# Patient Record
Sex: MALE | URBAN - METROPOLITAN AREA
[De-identification: names, ages, dates, MRNs, and addresses within clinical notes are randomized per-mention and may not be internally consistent; named-entity substitution may affect disease eponyms.]

---

## 2023-04-14 ENCOUNTER — TELEPHONE ENCOUNTER (OUTPATIENT)
Dept: URBAN - METROPOLITAN AREA CLINIC 64 | Facility: CLINIC | Age: 28
End: 2023-04-14

## 2023-04-25 ENCOUNTER — WEB ENCOUNTER (OUTPATIENT)
Dept: URBAN - METROPOLITAN AREA CLINIC 60 | Facility: CLINIC | Age: 28
End: 2023-04-25

## 2023-04-25 ENCOUNTER — WEB ENCOUNTER (OUTPATIENT)
Dept: URBAN - METROPOLITAN AREA CLINIC 63 | Facility: CLINIC | Age: 28
End: 2023-04-25

## 2023-04-27 PROBLEM — 266435005: Status: ACTIVE | Noted: 2023-04-27

## 2023-05-01 ENCOUNTER — OFFICE VISIT (OUTPATIENT)
Dept: URBAN - METROPOLITAN AREA CLINIC 60 | Facility: CLINIC | Age: 28
End: 2023-05-01
Payer: COMMERCIAL

## 2023-05-01 ENCOUNTER — LAB OUTSIDE AN ENCOUNTER (OUTPATIENT)
Dept: URBAN - METROPOLITAN AREA CLINIC 60 | Facility: CLINIC | Age: 28
End: 2023-05-01

## 2023-05-01 VITALS
HEART RATE: 77 BPM | HEIGHT: 70 IN | BODY MASS INDEX: 29.06 KG/M2 | DIASTOLIC BLOOD PRESSURE: 80 MMHG | SYSTOLIC BLOOD PRESSURE: 122 MMHG | TEMPERATURE: 98 F | WEIGHT: 203 LBS | OXYGEN SATURATION: 98 %

## 2023-05-01 DIAGNOSIS — R11.14 BILIOUS VOMITING WITH NAUSEA: ICD-10-CM

## 2023-05-01 DIAGNOSIS — K21.9 GASTROESOPHAGEAL REFLUX DISEASE WITHOUT ESOPHAGITIS: ICD-10-CM

## 2023-05-01 PROCEDURE — 99204 OFFICE O/P NEW MOD 45 MIN: CPT | Performed by: PHYSICIAN ASSISTANT

## 2023-05-01 RX ORDER — OMEPRAZOLE 20 MG/1
1 CAPSULE 30 MINUTES BEFORE MEALS CAPSULE, DELAYED RELEASE ORAL TWICE A DAY
Qty: 60 CAPSULE | Refills: 2 | OUTPATIENT
Start: 2023-05-01

## 2023-05-01 RX ORDER — OMEPRAZOLE 20 MG/1
1 CAPSULE 30 MINUTES BEFORE MORNING MEAL CAPSULE, DELAYED RELEASE ORAL ONCE A DAY
Qty: 30 | Refills: 2 | OUTPATIENT
Start: 2023-05-01

## 2023-05-01 NOTE — HPI-TODAY'S VISIT:
Cheryl is a pleasant 28-year-old male who presents today for evaluation of GERD  H. pylori stool antigen dated 2/6/2023 negative  Labs dated 2/3/2023 showed a TSH of 0.38 (L).  Normal renal function.  Normal LFTs.  Lipase normal.  Normal hemoglobin.  Normal platelets.   Over the last few months has noted worsening reflux despite omeprazole 20 mg qd. Symptoms worse with in the morning. Patient is EGD and colonoscopy naive. Notes 1-2 daily bowel movements with occasional diarrhea. Notes frequent nausea and vomiting. Mentions vomiting old food from the day before. Denies dysphagia, odynophagia, hematemesis, coffee ground emesis, abdominal pain, change in bowel habits, abnormal weight loss, BRBPR or melena. Denies family history of colon cancer or colon polyps. No other GI complaints at this time

## 2023-05-08 ENCOUNTER — LAB OUTSIDE AN ENCOUNTER (OUTPATIENT)
Dept: URBAN - METROPOLITAN AREA CLINIC 60 | Facility: CLINIC | Age: 28
End: 2023-05-08

## 2023-05-19 ENCOUNTER — TELEPHONE ENCOUNTER (OUTPATIENT)
Dept: URBAN - METROPOLITAN AREA CLINIC 64 | Facility: CLINIC | Age: 28
End: 2023-05-19

## 2023-06-02 ENCOUNTER — CLAIMS CREATED FROM THE CLAIM WINDOW (OUTPATIENT)
Dept: URBAN - METROPOLITAN AREA SURGERY CENTER 4 | Facility: SURGERY CENTER | Age: 28
End: 2023-06-02
Payer: COMMERCIAL

## 2023-06-02 ENCOUNTER — CLAIMS CREATED FROM THE CLAIM WINDOW (OUTPATIENT)
Dept: URBAN - METROPOLITAN AREA CLINIC 4 | Facility: CLINIC | Age: 28
End: 2023-06-02
Payer: COMMERCIAL

## 2023-06-02 DIAGNOSIS — R11.2 NAUSEA & VOMITING: ICD-10-CM

## 2023-06-02 DIAGNOSIS — R10.13 EPIGASTRIC PAIN: ICD-10-CM

## 2023-06-02 DIAGNOSIS — K29.70 GASTRITIS: ICD-10-CM

## 2023-06-02 DIAGNOSIS — K29.60 OTHER GASTRITIS WITHOUT BLEEDING: ICD-10-CM

## 2023-06-02 DIAGNOSIS — K31.89 OTHER DISEASES OF STOMACH AND DUODENUM: ICD-10-CM

## 2023-06-02 DIAGNOSIS — K21.9 GASTRO-ESOPHAGEAL REFLUX DISEASE WITHOUT ESOPHAGITIS: ICD-10-CM

## 2023-06-02 PROCEDURE — 88313 SPECIAL STAINS GROUP 2: CPT | Performed by: PATHOLOGY

## 2023-06-02 PROCEDURE — 88312 SPECIAL STAINS GROUP 1: CPT | Performed by: PATHOLOGY

## 2023-06-02 PROCEDURE — 43239 EGD BIOPSY SINGLE/MULTIPLE: CPT | Performed by: INTERNAL MEDICINE

## 2023-06-02 PROCEDURE — 88305 TISSUE EXAM BY PATHOLOGIST: CPT | Performed by: PATHOLOGY

## 2023-06-02 PROCEDURE — 00731 ANES UPR GI NDSC PX NOS: CPT | Performed by: NURSE ANESTHETIST, CERTIFIED REGISTERED

## 2023-06-02 RX ORDER — OMEPRAZOLE 20 MG/1
1 CAPSULE 30 MINUTES BEFORE MORNING MEAL CAPSULE, DELAYED RELEASE ORAL ONCE A DAY
Qty: 30 | Refills: 2 | Status: ACTIVE | COMMUNITY
Start: 2023-05-01

## 2023-06-02 RX ORDER — OMEPRAZOLE 20 MG/1
1 CAPSULE 30 MINUTES BEFORE MEALS CAPSULE, DELAYED RELEASE ORAL TWICE A DAY
Qty: 60 CAPSULE | Refills: 2 | Status: ACTIVE | COMMUNITY
Start: 2023-05-01

## 2023-06-08 PROBLEM — 4556007: Status: ACTIVE | Noted: 2023-06-08

## 2023-06-08 PROBLEM — 266435005: Status: ACTIVE | Noted: 2023-06-08

## 2023-06-19 ENCOUNTER — OFFICE VISIT (OUTPATIENT)
Dept: URBAN - METROPOLITAN AREA CLINIC 63 | Facility: CLINIC | Age: 28
End: 2023-06-19
Payer: COMMERCIAL

## 2023-06-19 VITALS
TEMPERATURE: 96.2 F | DIASTOLIC BLOOD PRESSURE: 70 MMHG | HEART RATE: 76 BPM | WEIGHT: 208 LBS | HEIGHT: 70 IN | OXYGEN SATURATION: 94 % | SYSTOLIC BLOOD PRESSURE: 140 MMHG | BODY MASS INDEX: 29.78 KG/M2

## 2023-06-19 DIAGNOSIS — K29.60 OTHER GASTRITIS WITHOUT BLEEDING: ICD-10-CM

## 2023-06-19 DIAGNOSIS — K31.89 OTHER DISEASES OF STOMACH AND DUODENUM: ICD-10-CM

## 2023-06-19 DIAGNOSIS — K21.9 GASTRO-ESOPHAGEAL REFLUX DISEASE WITHOUT ESOPHAGITIS: ICD-10-CM

## 2023-06-19 PROCEDURE — 99214 OFFICE O/P EST MOD 30 MIN: CPT | Performed by: PHYSICIAN ASSISTANT

## 2023-06-19 RX ORDER — FAMOTIDINE 20 MG/1
1 TABLET AT BEDTIME AS NEEDED TABLET, FILM COATED ORAL ONCE A DAY
Qty: 30 | Refills: 2 | OUTPATIENT
Start: 2023-06-19

## 2023-06-19 RX ORDER — OMEPRAZOLE 20 MG/1
1 CAPSULE 30 MINUTES BEFORE MORNING MEAL CAPSULE, DELAYED RELEASE ORAL ONCE A DAY
Qty: 30 | Refills: 2 | Status: ACTIVE | COMMUNITY
Start: 2023-05-01

## 2023-06-19 NOTE — HPI-TODAY'S VISIT:
Cheryl is a pleasant 28-year-old male who presents today for a procedure follow up.  EGD dated 6/2/2023 showed a regular Z-line.  Gastritis.  Normal duodenum. No significant abnormality in the antrum/body. Lower third esophageal biopsy showed squamocolumnar mucosa with reflux type changes.  No evidence of Gracia's or EOE  H. pylori stool antigen dated 2/6/2023 negative  Labs dated 2/3/2023 showed a TSH of 0.38 (L).  Normal renal function.  Normal LFTs.  Lipase normal.  Normal hemoglobin.  Normal platelets.   No issues after procedure. Last visit noted worsening reflux despite omeprazole 20 mg qd. Symptoms worse with in the morning. Admitted to nausea and vomiting. Mentioned vomiting old food from the day before. He did not complete GES due to cost. He was increased to omeprazole 20 mg BID. He notes acid reflux in the morning still. Notes 1-2 daily bowel movements without constipation or diarrhea. No longer with nausea and vomiting. Denies dysphagia, odynophagia, hematemesis, coffee ground emesis, abdominal pain, change in bowel habits, abnormal weight loss, BRBPR or melena. Denies family history of colon cancer or colon polyps. No other GI complaints at this time

## 2023-06-25 ENCOUNTER — ERX REFILL RESPONSE (OUTPATIENT)
Dept: URBAN - METROPOLITAN AREA CLINIC 60 | Facility: CLINIC | Age: 28
End: 2023-06-25

## 2023-06-25 RX ORDER — OMEPRAZOLE 20 MG/1
TAKE 1 CAPSULE BY MOUTH EVERY DAY 30 MINUTES BEFORE MORNING MEAL FOR 30 DAYS CAPSULE, DELAYED RELEASE ORAL
Qty: 30 CAPSULE | Refills: 2 | OUTPATIENT

## 2023-06-25 RX ORDER — OMEPRAZOLE 20 MG/1
1 CAPSULE 30 MINUTES BEFORE MORNING MEAL CAPSULE, DELAYED RELEASE ORAL ONCE A DAY
Qty: 30 | Refills: 2 | OUTPATIENT

## 2023-08-21 ENCOUNTER — DASHBOARD ENCOUNTERS (OUTPATIENT)
Age: 28
End: 2023-08-21

## 2023-08-21 ENCOUNTER — OFFICE VISIT (OUTPATIENT)
Dept: URBAN - METROPOLITAN AREA CLINIC 60 | Facility: CLINIC | Age: 28
End: 2023-08-21
Payer: COMMERCIAL

## 2023-08-21 VITALS
TEMPERATURE: 97.7 F | BODY MASS INDEX: 30.21 KG/M2 | OXYGEN SATURATION: 97 % | WEIGHT: 211 LBS | DIASTOLIC BLOOD PRESSURE: 80 MMHG | HEIGHT: 70 IN | RESPIRATION RATE: 20 BRPM | HEART RATE: 87 BPM | SYSTOLIC BLOOD PRESSURE: 120 MMHG

## 2023-08-21 DIAGNOSIS — K31.89 OTHER DISEASES OF STOMACH AND DUODENUM: ICD-10-CM

## 2023-08-21 DIAGNOSIS — K21.9 GASTRO-ESOPHAGEAL REFLUX DISEASE WITHOUT ESOPHAGITIS: ICD-10-CM

## 2023-08-21 DIAGNOSIS — K29.60 OTHER GASTRITIS WITHOUT BLEEDING: ICD-10-CM

## 2023-08-21 PROCEDURE — 99213 OFFICE O/P EST LOW 20 MIN: CPT | Performed by: PHYSICIAN ASSISTANT

## 2023-08-21 RX ORDER — FAMOTIDINE 20 MG/1
1 TABLET AT BEDTIME AS NEEDED TABLET, FILM COATED ORAL ONCE A DAY
Qty: 30 | Refills: 2 | Status: ACTIVE | COMMUNITY
Start: 2023-06-19

## 2023-08-21 RX ORDER — FAMOTIDINE 20 MG/1
1 TABLET AS NEEDED TABLET, FILM COATED ORAL TWICE A DAY
Qty: 180 TABLET | Refills: 1 | OUTPATIENT

## 2023-08-21 NOTE — HPI-TODAY'S VISIT:
Cheryl is a pleasant 28-year-old male who presents today for a follow up.  EGD dated 6/2/2023 showed a regular Z-line.  Gastritis.  Normal duodenum. No significant abnormality in the antrum/body. Lower third esophageal biopsy showed squamocolumnar mucosa with reflux type changes.  No evidence of Gracia's or EOE  H. pylori stool antigen dated 2/6/2023 negative  Labs dated 2/3/2023 showed a TSH of 0.38 (L).  Normal renal function.  Normal LFTs.  Lipase normal.  Normal hemoglobin.  Normal platelets.   He did not complete GES due to cost. Currently on omeprazole 20 mg qd and famotidine 20 mg qhs. Reflux under control. Notes 1-2 daily bowel movements without constipation or diarrhea. Denies nausea, vomiting, dysphagia, odynophagia, hematemesis, coffee ground emesis, abdominal pain, change in bowel habits, abnormal weight loss, BRBPR or melena. Denies family history of colon cancer or colon polyps. No other GI complaints at this time

## 2023-09-13 ENCOUNTER — ERX REFILL RESPONSE (OUTPATIENT)
Dept: URBAN - METROPOLITAN AREA CLINIC 63 | Facility: CLINIC | Age: 28
End: 2023-09-13

## 2023-09-13 RX ORDER — FAMOTIDINE 20 MG/1
1 TABLET AS NEEDED TABLET, FILM COATED ORAL ONCE A DAY
Qty: 90 | Refills: 1 | OUTPATIENT

## 2023-09-13 RX ORDER — FAMOTIDINE 20 MG/1
1 TABLET AS NEEDED TABLET, FILM COATED ORAL TWICE A DAY
Qty: 180 TABLET | Refills: 1 | OUTPATIENT

## 2024-11-12 ENCOUNTER — OFFICE VISIT (OUTPATIENT)
Dept: URBAN - METROPOLITAN AREA CLINIC 60 | Facility: CLINIC | Age: 29
End: 2024-11-12
Payer: COMMERCIAL

## 2024-11-12 ENCOUNTER — LAB OUTSIDE AN ENCOUNTER (OUTPATIENT)
Dept: URBAN - METROPOLITAN AREA CLINIC 60 | Facility: CLINIC | Age: 29
End: 2024-11-12

## 2024-11-12 VITALS
DIASTOLIC BLOOD PRESSURE: 80 MMHG | HEIGHT: 70 IN | OXYGEN SATURATION: 97 % | SYSTOLIC BLOOD PRESSURE: 120 MMHG | RESPIRATION RATE: 20 BRPM | WEIGHT: 224 LBS | TEMPERATURE: 98.4 F | BODY MASS INDEX: 32.07 KG/M2 | HEART RATE: 89 BPM

## 2024-11-12 DIAGNOSIS — R10.13 DYSPEPSIA: ICD-10-CM

## 2024-11-12 DIAGNOSIS — R19.7 DIARRHEA, UNSPECIFIED TYPE: ICD-10-CM

## 2024-11-12 DIAGNOSIS — K21.9 GASTROESOPHAGEAL REFLUX DISEASE WITHOUT ESOPHAGITIS: ICD-10-CM

## 2024-11-12 PROCEDURE — 99214 OFFICE O/P EST MOD 30 MIN: CPT | Performed by: NURSE PRACTITIONER

## 2024-11-12 RX ORDER — SUCRALFATE 1 G/1
1 TABLET ON AN EMPTY STOMACH TABLET ORAL TWICE A DAY
Qty: 180 TABLET | Refills: 0 | OUTPATIENT
Start: 2024-11-12 | End: 2025-02-10

## 2024-11-12 RX ORDER — VONOPRAZAN FUMARATE 26.72 MG/1
1 TABLET TABLET ORAL ONCE A DAY
Qty: 90 TABLET | Refills: 0 | OUTPATIENT
Start: 2024-11-12 | End: 2025-02-10

## 2024-11-12 NOTE — HPI-TODAY'S VISIT:
11/24 Patient is here today complaining having diarrhea his diarrhea started more than a month ago mainly in the morning time and after meals.  Diarrhea had no blood, no mucus, is not related with fever or any other GI symptoms but increasing gases.  Patient also complaining of symptoms of dyspepsia and GERD.  His symptoms are chronic as well but now seems to be worse his last EGD was done 5 years ago.  The procedure shows evidence of Z-line irregular biopsy, gastritis, and normal duodenum.  Biopsy results show evidence of stomach with no significant abnormality.  Lower third esophageal mucosa with reflux type changes, negative for Gracia esophagus, dysplasia, malignancy.  Patient will have diarrhea workup.  Gastric emptying study, diet for dyspepsia and GERD.  Treatment with Voquezna 20 mg once a day and sucralfate 1 g twice a day.  We may do EGD open patient clinical course.

## 2024-11-15 ENCOUNTER — WEB ENCOUNTER (OUTPATIENT)
Dept: URBAN - METROPOLITAN AREA CLINIC 60 | Facility: CLINIC | Age: 29
End: 2024-11-15

## 2024-11-15 RX ORDER — VONOPRAZAN FUMARATE 26.72 MG/1
1 TABLET TABLET ORAL ONCE A DAY
Qty: 90 TABLET | Refills: 0
Start: 2024-11-12 | End: 2025-02-16

## 2024-11-20 ENCOUNTER — OFFICE VISIT (OUTPATIENT)
Dept: URBAN - METROPOLITAN AREA CLINIC 60 | Facility: CLINIC | Age: 29
End: 2024-11-20

## 2024-11-22 ENCOUNTER — WEB ENCOUNTER (OUTPATIENT)
Dept: URBAN - METROPOLITAN AREA CLINIC 60 | Facility: CLINIC | Age: 29
End: 2024-11-22

## 2024-11-27 ENCOUNTER — WEB ENCOUNTER (OUTPATIENT)
Dept: URBAN - METROPOLITAN AREA CLINIC 60 | Facility: CLINIC | Age: 29
End: 2024-11-27

## 2024-12-03 ENCOUNTER — WEB ENCOUNTER (OUTPATIENT)
Dept: URBAN - METROPOLITAN AREA CLINIC 60 | Facility: CLINIC | Age: 29
End: 2024-12-03

## 2024-12-13 ENCOUNTER — WEB ENCOUNTER (OUTPATIENT)
Dept: URBAN - METROPOLITAN AREA CLINIC 60 | Facility: CLINIC | Age: 29
End: 2024-12-13

## 2024-12-27 ENCOUNTER — OFFICE VISIT (OUTPATIENT)
Dept: URBAN - METROPOLITAN AREA CLINIC 60 | Facility: CLINIC | Age: 29
End: 2024-12-27
Payer: COMMERCIAL

## 2024-12-27 VITALS
OXYGEN SATURATION: 97 % | RESPIRATION RATE: 20 BRPM | BODY MASS INDEX: 32.24 KG/M2 | WEIGHT: 225.2 LBS | DIASTOLIC BLOOD PRESSURE: 80 MMHG | TEMPERATURE: 97.5 F | HEIGHT: 70 IN | SYSTOLIC BLOOD PRESSURE: 122 MMHG | HEART RATE: 88 BPM

## 2024-12-27 DIAGNOSIS — K29.50 CHRONIC GASTRITIS WITHOUT BLEEDING, UNSPECIFIED GASTRITIS TYPE: ICD-10-CM

## 2024-12-27 DIAGNOSIS — K21.9 GASTROESOPHAGEAL REFLUX DISEASE WITHOUT ESOPHAGITIS: ICD-10-CM

## 2024-12-27 PROBLEM — 8493009: Status: ACTIVE | Noted: 2024-12-27

## 2024-12-27 PROCEDURE — 99214 OFFICE O/P EST MOD 30 MIN: CPT | Performed by: NURSE PRACTITIONER

## 2024-12-27 RX ORDER — VONOPRAZAN FUMARATE 26.72 MG/1
1 TABLET TABLET ORAL ONCE A DAY
Qty: 90 TABLET | Refills: 0 | Status: ACTIVE | COMMUNITY
Start: 2024-11-12 | End: 2025-02-16

## 2024-12-27 RX ORDER — SUCRALFATE 1 G/1
1 TABLET ON AN EMPTY STOMACH TABLET ORAL TWICE A DAY
Qty: 180 TABLET | Refills: 0 | Status: ACTIVE | COMMUNITY
Start: 2024-11-12 | End: 2025-02-10

## 2024-12-27 RX ORDER — FAMOTIDINE 20 MG/1
TAKE 1 TABLET TABLET, FILM COATED ORAL ONCE A DAY
Qty: 90 | Refills: 3 | OUTPATIENT
Start: 2024-12-27

## 2024-12-27 NOTE — HPI-TODAY'S VISIT:
11/24 Patient is here today complaining having diarrhea his diarrhea started more than a month ago mainly in the morning time and after meals.  Diarrhea had no blood, no mucus, is not related with fever or any other GI symptoms but increasing gases.  Patient also complaining of symptoms of dyspepsia and GERD.  His symptoms are chronic as well but now seems to be worse his last EGD was done 5 years ago.  The procedure shows evidence of Z-line irregular biopsy, gastritis, and normal duodenum.  Biopsy results show evidence of stomach with no significant abnormality.  Lower third esophageal mucosa with reflux type changes, negative for Gracia esophagus, dysplasia, malignancy.  Patient will have diarrhea workup.  Gastric emptying study, diet for dyspepsia and GERD.  Treatment with Voquezna 20 mg once a day and sucralfate 1 g twice a day.  We may do EGD open patient clinical course.  12/24 Patient here today in good general state.  He states have no more diarrhea.  His stool studies were negative.  Patient also stated his symptom of gastritis and reflux are better.  He had no more vomiting or abdominal pain.  He is eating better.  Patient gastric empty study that shows evidence of rapid emptying of the stomach.  Patient will continue with treatment with Voquezna will DC Carafate and start on famotidine once a day for 4 weeks.

## 2024-12-31 ENCOUNTER — TELEPHONE ENCOUNTER (OUTPATIENT)
Dept: URBAN - METROPOLITAN AREA CLINIC 63 | Facility: CLINIC | Age: 29
End: 2024-12-31

## 2024-12-31 RX ORDER — RIFAXIMIN 200 MG/1
2 TABLETS TABLET ORAL THREE TIMES A DAY
Qty: 60 | OUTPATIENT
Start: 2024-12-31 | End: 2025-01-10

## 2025-01-02 ENCOUNTER — WEB ENCOUNTER (OUTPATIENT)
Dept: URBAN - METROPOLITAN AREA CLINIC 60 | Facility: CLINIC | Age: 30
End: 2025-01-02

## 2025-01-03 ENCOUNTER — WEB ENCOUNTER (OUTPATIENT)
Dept: URBAN - METROPOLITAN AREA CLINIC 60 | Facility: CLINIC | Age: 30
End: 2025-01-03

## 2025-01-07 ENCOUNTER — WEB ENCOUNTER (OUTPATIENT)
Dept: URBAN - METROPOLITAN AREA CLINIC 60 | Facility: CLINIC | Age: 30
End: 2025-01-07

## 2025-01-08 ENCOUNTER — TELEPHONE ENCOUNTER (OUTPATIENT)
Dept: URBAN - METROPOLITAN AREA CLINIC 60 | Facility: CLINIC | Age: 30
End: 2025-01-08

## 2025-01-08 RX ORDER — CIPROFLOXACIN 500 MG/1
1 TABLET TABLET, FILM COATED ORAL
Qty: 20 TABLET | OUTPATIENT
Start: 2025-01-08 | End: 2025-01-18

## 2025-02-18 ENCOUNTER — TELEPHONE ENCOUNTER (OUTPATIENT)
Dept: URBAN - METROPOLITAN AREA CLINIC 63 | Facility: CLINIC | Age: 30
End: 2025-02-18

## 2025-02-21 ENCOUNTER — OFFICE VISIT (OUTPATIENT)
Dept: URBAN - METROPOLITAN AREA CLINIC 60 | Facility: CLINIC | Age: 30
End: 2025-02-21
Payer: COMMERCIAL

## 2025-02-21 VITALS
WEIGHT: 225 LBS | RESPIRATION RATE: 20 BRPM | HEART RATE: 90 BPM | SYSTOLIC BLOOD PRESSURE: 120 MMHG | DIASTOLIC BLOOD PRESSURE: 78 MMHG | OXYGEN SATURATION: 98 % | TEMPERATURE: 97.9 F | HEIGHT: 70 IN | BODY MASS INDEX: 32.21 KG/M2

## 2025-02-21 DIAGNOSIS — R11.2 ACUTE NAUSEA WITH NONBILIOUS VOMITING: ICD-10-CM

## 2025-02-21 DIAGNOSIS — K21.9 ACID REFLUX: ICD-10-CM

## 2025-02-21 PROCEDURE — 99214 OFFICE O/P EST MOD 30 MIN: CPT | Performed by: NURSE PRACTITIONER

## 2025-02-21 RX ORDER — SUCRALFATE 1 G/1
1 TABLET ON AN EMPTY STOMACH TABLET ORAL TWICE A DAY
Status: ACTIVE | COMMUNITY

## 2025-02-21 RX ORDER — VONOPRAZAN FUMARATE 26.72 MG/1
1 TABLET TABLET ORAL ONCE A DAY
Status: ACTIVE | COMMUNITY

## 2025-02-21 RX ORDER — FAMOTIDINE 20 MG/1
TAKE 1 TABLET TABLET, FILM COATED ORAL ONCE A DAY
Qty: 90 | Refills: 3 | Status: ACTIVE | COMMUNITY
Start: 2024-12-27

## 2025-02-21 RX ORDER — FAMOTIDINE 40 MG/1
1 TABLET AT BEDTIME AS NEEDED TABLET, FILM COATED ORAL ONCE A DAY
Qty: 30 | Refills: 2 | OUTPATIENT
Start: 2025-02-21

## 2025-02-21 NOTE — HPI-TODAY'S VISIT:
11/24 Patient is here today complaining having diarrhea his diarrhea started more than a month ago mainly in the morning time and after meals.  Diarrhea had no blood, no mucus, is not related with fever or any other GI symptoms but increasing gases.  Patient also complaining of symptoms of dyspepsia and GERD.  His symptoms are chronic as well but now seems to be worse his last EGD was done 5 years ago.  The procedure shows evidence of Z-line irregular biopsy, gastritis, and normal duodenum.  Biopsy results show evidence of stomach with no significant abnormality.  Lower third esophageal mucosa with reflux type changes, negative for Gracia esophagus, dysplasia, malignancy.  Patient will have diarrhea workup.  Gastric emptying study, diet for dyspepsia and GERD.  Treatment with Voquezna 20 mg once a day and sucralfate 1 g twice a day.  We may do EGD open patient clinical course.  12/24 Patient here today in good general state.  He states have no more diarrhea.  His stool studies were negative.  Patient also stated his symptom of gastritis and reflux are better.  He had no more vomiting or abdominal pain.  He is eating better.  Patient gastric empty study that shows evidence of rapid emptying of the stomach. Patient will continue with treatment with Voquezna will DC Carafate and start on famotidine once a day for 4 weeks.  02/25 Patient here today in good general state.  He is here complaining of having symptom of gastritis and reflux.  He has been doing treatment with PPI, C, famotidine and diet in the last several months with no good outcome.  Patient laboratories was positive for SIBO he did complete a treatment with ciprofloxacin and diarrhea have improved. Patient will continue treatment with famotidine and diet. EGD.

## 2025-02-25 ENCOUNTER — TELEPHONE ENCOUNTER (OUTPATIENT)
Dept: URBAN - METROPOLITAN AREA CLINIC 7 | Facility: CLINIC | Age: 30
End: 2025-02-25

## 2025-02-26 ENCOUNTER — OFFICE VISIT (OUTPATIENT)
Dept: URBAN - METROPOLITAN AREA CLINIC 63 | Facility: CLINIC | Age: 30
End: 2025-02-26

## 2025-02-26 ENCOUNTER — CLAIMS CREATED FROM THE CLAIM WINDOW (OUTPATIENT)
Dept: URBAN - METROPOLITAN AREA SURGERY CENTER 5 | Facility: SURGERY CENTER | Age: 30
End: 2025-02-26
Payer: COMMERCIAL

## 2025-02-26 ENCOUNTER — OFFICE VISIT (OUTPATIENT)
Dept: URBAN - METROPOLITAN AREA SURGERY CENTER 4 | Facility: SURGERY CENTER | Age: 30
End: 2025-02-26

## 2025-02-26 DIAGNOSIS — K44.9 DIAPHRAGMATIC HERNIA WITHOUT OBSTRUCTION OR GANGRENE: ICD-10-CM

## 2025-02-26 DIAGNOSIS — K21.9 CHRONIC GERD: ICD-10-CM

## 2025-02-26 PROCEDURE — 43239 EGD BIOPSY SINGLE/MULTIPLE: CPT | Performed by: INTERNAL MEDICINE

## 2025-02-26 RX ORDER — VONOPRAZAN FUMARATE 26.72 MG/1
1 TABLET TABLET ORAL ONCE A DAY
Status: ACTIVE | COMMUNITY

## 2025-02-26 RX ORDER — FAMOTIDINE 20 MG/1
TAKE 1 TABLET TABLET, FILM COATED ORAL ONCE A DAY
Qty: 90 | Refills: 3 | Status: ACTIVE | COMMUNITY
Start: 2024-12-27

## 2025-02-26 RX ORDER — SUCRALFATE 1 G/1
1 TABLET ON AN EMPTY STOMACH TABLET ORAL TWICE A DAY
Status: ACTIVE | COMMUNITY

## 2025-02-26 RX ORDER — FAMOTIDINE 40 MG/1
1 TABLET AT BEDTIME AS NEEDED TABLET, FILM COATED ORAL ONCE A DAY
Qty: 30 | Refills: 2 | Status: ACTIVE | COMMUNITY
Start: 2025-02-21

## 2025-02-28 ENCOUNTER — LAB OUTSIDE AN ENCOUNTER (OUTPATIENT)
Dept: URBAN - METROPOLITAN AREA CLINIC 60 | Facility: CLINIC | Age: 30
End: 2025-02-28

## 2025-03-13 ENCOUNTER — OFFICE VISIT (OUTPATIENT)
Dept: URBAN - METROPOLITAN AREA CLINIC 60 | Facility: CLINIC | Age: 30
End: 2025-03-13
Payer: COMMERCIAL

## 2025-03-13 VITALS
SYSTOLIC BLOOD PRESSURE: 120 MMHG | WEIGHT: 221 LBS | DIASTOLIC BLOOD PRESSURE: 80 MMHG | RESPIRATION RATE: 20 BRPM | HEART RATE: 90 BPM | HEIGHT: 70 IN | OXYGEN SATURATION: 98 % | TEMPERATURE: 97.8 F | BODY MASS INDEX: 31.64 KG/M2

## 2025-03-13 DIAGNOSIS — K21.9 ACID REFLUX: ICD-10-CM

## 2025-03-13 DIAGNOSIS — K44.9 HIATAL HERNIA: ICD-10-CM

## 2025-03-13 DIAGNOSIS — K29.50 CHRONIC GASTRITIS WITHOUT BLEEDING, UNSPECIFIED GASTRITIS TYPE: ICD-10-CM

## 2025-03-13 PROCEDURE — 99214 OFFICE O/P EST MOD 30 MIN: CPT | Performed by: NURSE PRACTITIONER

## 2025-03-13 RX ORDER — FAMOTIDINE 40 MG/1
1 TABLET AT BEDTIME AS NEEDED TABLET, FILM COATED ORAL ONCE A DAY
Qty: 30 | Refills: 2 | Status: ACTIVE | COMMUNITY
Start: 2025-02-21

## 2025-03-13 RX ORDER — VONOPRAZAN FUMARATE 26.72 MG/1
1 TABLET TABLET ORAL ONCE A DAY
Status: ACTIVE | COMMUNITY

## 2025-03-13 RX ORDER — SUCRALFATE 1 G/1
1 TABLET ON AN EMPTY STOMACH TABLET ORAL TWICE A DAY
Status: ACTIVE | COMMUNITY

## 2025-03-13 RX ORDER — FAMOTIDINE 20 MG/1
TAKE 1 TABLET TABLET, FILM COATED ORAL ONCE A DAY
Qty: 90 | Refills: 0 | OUTPATIENT
Start: 2025-03-13

## 2025-03-13 NOTE — HPI-TODAY'S VISIT:
11/24 Patient is here today complaining having diarrhea his diarrhea started more than a month ago mainly in the morning time and after meals.  Diarrhea had no blood, no mucus, is not related with fever or any other GI symptoms but increasing gases.  Patient also complaining of symptoms of dyspepsia and GERD.  His symptoms are chronic as well but now seems to be worse his last EGD was done 5 years ago.  The procedure shows evidence of Z-line irregular biopsy, gastritis, and normal duodenum.  Biopsy results show evidence of stomach with no significant abnormality.  Lower third esophageal mucosa with reflux type changes, negative for Gracia esophagus, dysplasia, malignancy.  Patient will have diarrhea workup.  Gastric emptying study, diet for dyspepsia and GERD.  Treatment with Voquezna 20 mg once a day and sucralfate 1 g twice a day.  We may do EGD open patient clinical course.  12/24 Patient here today in good general state.  He states have no more diarrhea.  His stool studies were negative.  Patient also stated his symptom of gastritis and reflux are better.  He had no more vomiting or abdominal pain.  He is eating better.  Patient gastric empty study that shows evidence of rapid emptying of the stomach. Patient will continue with treatment with Voquezna will DC Carafate and start on famotidine once a day for 4 weeks.  02/25 Patient here today in good general state.  He is here complaining of having symptom of gastritis and reflux.  He has been doing treatment with PPI, C, famotidine and diet in the last several months with no good outcome.  Patient laboratories was positive for SIBO he did complete a treatment with ciprofloxacin and diarrhea have improved. Patient will continue treatment with famotidine and diet. EGD. 03/25 Patient's EGD shows evidence of normal esophagus and examined duodenum.  Normal stomach, and a small hiatal hernia.  Biopsy results show evidence of duodenal mucosa with no significant abnormality.  Stomach, antrum, biopsy demonstrating chronic gastritis, negative for H. pylori organism, intestinal metaplasia, dysplasia, malignancy.  Lower third esophageal mucosa, mucosa with reflux type changes, negative for Gracia esophagus, eosinophilic esophagitis, Gracia esophagus, dysplasia, or malignancy.  Esophagus proximal biopsy squamous mucosa without significant abnormality.  Negative for eosinophilic esophagitis, dysplasia, malignancy.